# Patient Record
Sex: MALE | Employment: FULL TIME | URBAN - NONMETROPOLITAN AREA
[De-identification: names, ages, dates, MRNs, and addresses within clinical notes are randomized per-mention and may not be internally consistent; named-entity substitution may affect disease eponyms.]

---

## 2022-06-21 ENCOUNTER — OFFICE VISIT (OUTPATIENT)
Dept: PRIMARY CARE CLINIC | Age: 47
End: 2022-06-21
Payer: COMMERCIAL

## 2022-06-21 VITALS
OXYGEN SATURATION: 98 % | HEIGHT: 71 IN | BODY MASS INDEX: 25.55 KG/M2 | SYSTOLIC BLOOD PRESSURE: 98 MMHG | DIASTOLIC BLOOD PRESSURE: 64 MMHG | HEART RATE: 88 BPM | WEIGHT: 182.5 LBS

## 2022-06-21 DIAGNOSIS — K21.00 GASTROESOPHAGEAL REFLUX DISEASE WITH ESOPHAGITIS, UNSPECIFIED WHETHER HEMORRHAGE: ICD-10-CM

## 2022-06-21 DIAGNOSIS — E11.9 TYPE 2 DIABETES MELLITUS WITHOUT COMPLICATION, WITHOUT LONG-TERM CURRENT USE OF INSULIN (HCC): Primary | ICD-10-CM

## 2022-06-21 DIAGNOSIS — Z09 HOSPITAL DISCHARGE FOLLOW-UP: ICD-10-CM

## 2022-06-21 PROBLEM — K21.9 GERD (GASTROESOPHAGEAL REFLUX DISEASE): Status: ACTIVE | Noted: 2022-03-25

## 2022-06-21 PROBLEM — E11.10 DIABETIC KETOACIDOSIS WITHOUT COMA (HCC): Status: ACTIVE | Noted: 2022-04-12

## 2022-06-21 PROBLEM — D41.01 NEOPLASM OF UNCERTAIN BEHAVIOR OF RIGHT KIDNEY: Status: ACTIVE | Noted: 2022-04-04

## 2022-06-21 PROCEDURE — 99204 OFFICE O/P NEW MOD 45 MIN: CPT | Performed by: NURSE PRACTITIONER

## 2022-06-21 PROCEDURE — 1111F DSCHRG MED/CURRENT MED MERGE: CPT | Performed by: NURSE PRACTITIONER

## 2022-06-21 RX ORDER — INSULIN GLARGINE 100 [IU]/ML
35 INJECTION, SOLUTION SUBCUTANEOUS
COMMUNITY
Start: 2021-12-31

## 2022-06-21 RX ORDER — ONDANSETRON 8 MG/1
8 TABLET, ORALLY DISINTEGRATING ORAL PRN
COMMUNITY
Start: 2022-06-10

## 2022-06-21 SDOH — ECONOMIC STABILITY: FOOD INSECURITY: WITHIN THE PAST 12 MONTHS, THE FOOD YOU BOUGHT JUST DIDN'T LAST AND YOU DIDN'T HAVE MONEY TO GET MORE.: NEVER TRUE

## 2022-06-21 SDOH — ECONOMIC STABILITY: FOOD INSECURITY: WITHIN THE PAST 12 MONTHS, YOU WORRIED THAT YOUR FOOD WOULD RUN OUT BEFORE YOU GOT MONEY TO BUY MORE.: NEVER TRUE

## 2022-06-21 ASSESSMENT — PATIENT HEALTH QUESTIONNAIRE - PHQ9
SUM OF ALL RESPONSES TO PHQ QUESTIONS 1-9: 0
SUM OF ALL RESPONSES TO PHQ QUESTIONS 1-9: 0
2. FEELING DOWN, DEPRESSED OR HOPELESS: 0
SUM OF ALL RESPONSES TO PHQ9 QUESTIONS 1 & 2: 0
SUM OF ALL RESPONSES TO PHQ QUESTIONS 1-9: 0
1. LITTLE INTEREST OR PLEASURE IN DOING THINGS: 0
SUM OF ALL RESPONSES TO PHQ QUESTIONS 1-9: 0

## 2022-06-21 ASSESSMENT — ENCOUNTER SYMPTOMS
EYE REDNESS: 0
COUGH: 0
WHEEZING: 0
SORE THROAT: 0
DIARRHEA: 0
VOMITING: 0
SHORTNESS OF BREATH: 0
NAUSEA: 0
ABDOMINAL PAIN: 0
EYE DISCHARGE: 0
RHINORRHEA: 0

## 2022-06-21 ASSESSMENT — SOCIAL DETERMINANTS OF HEALTH (SDOH): HOW HARD IS IT FOR YOU TO PAY FOR THE VERY BASICS LIKE FOOD, HOUSING, MEDICAL CARE, AND HEATING?: NOT HARD AT ALL

## 2022-06-21 NOTE — PROGRESS NOTES
[address] [facility]  [facility] [address]  [address] [address]  Dept: [phone]    Valeria Dockery is a 51 y.o. male New patient, who presents today for his medical conditions/complaints as noted below. Chief Complaint   Patient presents with    New Patient     pt is diabetic - sees endo - last A1C 8.9 04/13/22    Follow-Up from Hospital     pt reports having an ulcer - Dr. Mila Sims in [place] -        HPI:     HPI  Endocrinologist is Dr. Bonnie Hobbs in [place]  He was hospitalized 6/6/22 for 2 days at [facility] and then again on 6/11/22 for 2 days at [facility]   Each time ulcer became aggravated and he has nausea and vomiting and is unable to eat or drink he can not stay hydrated. This is a reoccurring issue  This is the 3rd time he has been in the hospital for the same thing this year. He states it usually last about 10 days. He is seeing Dr. Fish, gastroenterologist, in [place]. Dr. Fish does not him to take something for GERD until he has EGD completed. His PCP moved his practice so he needs a new PCP. HgA1C is usually a little lower than 8.9. He had partial kidney removal on 4/4/22 due to renal cell carcinoma. The removal took care of cancer. No chemotherapy or radiation.       Reviewed prior notes: oncology and urology   Reviewed previous:  Labs and Hospital Records    No results found for: LDLCHOLESTEROL, LDLCALC    (goal LDL is <100)   No results found for: AST, ALT, BUN, CR, LABA1C, TSH  BP Readings from Last 3 Encounters:   06/21/22 98/64          (goal 120/80)    Past Medical History:   Diagnosis Date    Cancer ([address]     kidney - partial kidney removal    Diabetes mellitus ([address]     GERD (gastroesophageal reflux disease)     ulcer      Past Surgical History:   Procedure Laterality Date    CHOLECYSTECTOMY  2017    KIDNEY SURGERY  04/04/2022    partial removal       Family History   Problem Relation Age of Onset    Heart Failure Father     Cancer Father         lung    Heart Failure Paternal Grandmother     Heart Failure Paternal Grandfather        Social History     Tobacco Use    Smoking status: Never Smoker    Smokeless tobacco: Never Used   Substance Use Topics    Alcohol use: Yes     Alcohol/week: 6.0 standard drinks     Types: 6 Cans of beer per week     Comment: on the weekend      Current Outpatient Medications   Medication Sig Dispense Refill    insulin glargine (BASAGLAR KWIKPEN) 100 UNIT/ML injection pen Inject 35 Units into the skin every morning (before breakfast)      metFORMIN (GLUCOPHAGE) 500 MG tablet Take 500 mg by mouth 2 times daily      ondansetron (ZOFRAN-ODT) 8 MG TBDP disintegrating tablet Place 8 mg under the tongue as needed       No current facility-administered medications for this visit. Allergies   Allergen Reactions    Bee Venom Anaphylaxis and Swelling    Hydrocodone-Acetaminophen Hives and Swelling       Health Maintenance   Topic Date Due    COVID-19 Vaccine (1) Never done    Depression Screen  Never done    HIV screen  Never done    Hepatitis C screen  Never done    DTaP/Tdap/Td vaccine (1 - Tdap) Never done    Diabetes screen  Never done    Lipids  Never done    Colorectal Cancer Screen  Never done    Flu vaccine (Season Ended) 09/01/2022    Hepatitis A vaccine  Aged Out    Hepatitis B vaccine  Aged Out    Hib vaccine  Aged Out    Meningococcal (ACWY) vaccine  Aged Out    Pneumococcal 0-64 years Vaccine  Aged Out       Subjective:      Review of Systems   Constitutional: Negative for chills and fever. HENT: Negative for rhinorrhea and sore throat. Eyes: Negative for discharge and redness. Respiratory: Negative for cough, shortness of breath and wheezing. Cardiovascular: Negative for chest pain and palpitations. Gastrointestinal: Negative for abdominal pain, diarrhea, nausea and vomiting. Genitourinary: Negative for dysuria and frequency. Musculoskeletal: Negative for arthralgias, gait problem and myalgias. Skin: Positive for rash (on face is normal for 4 hours after shower). Neurological: Positive for dizziness, light-headedness (when sick) and headaches (weekly). Psychiatric/Behavioral: Negative for dysphoric mood and sleep disturbance. The patient is not nervous/anxious. Objective:     BP 98/64   Pulse 88   Ht 5' 10.75\" (1.797 m)   Wt 182 lb 8 oz (82.8 kg)   SpO2 98%   BMI 25.63 kg/m²   Physical Exam  Vitals and nursing note reviewed. Constitutional:       General: He is not in acute distress. Appearance: He is well-developed. He is not ill-appearing. HENT:      Head: Normocephalic and atraumatic. Right Ear: External ear normal.      Left Ear: External ear normal.   Eyes:      General: No scleral icterus. Right eye: No discharge. Left eye: No discharge. Conjunctiva/sclera: Conjunctivae normal.   Neck:      Thyroid: No thyromegaly. Trachea: No tracheal deviation. Cardiovascular:      Rate and Rhythm: Normal rate and regular rhythm. Pulses:           Dorsalis pedis pulses are 2+ on the right side and 2+ on the left side. Posterior tibial pulses are 1+ on the right side and 1+ on the left side. Heart sounds: Normal heart sounds. Comments: No carotid bruit  Pulmonary:      Effort: Pulmonary effort is normal. No respiratory distress. Breath sounds: Normal breath sounds. No wheezing. Abdominal:      General: Bowel sounds are normal.      Palpations: Abdomen is soft. Musculoskeletal:      Right lower leg: No edema. Left lower leg: No edema. Feet:      Right foot:      Skin integrity: Erythema and dry skin present. Toenail Condition: Right toenails are abnormally thick. Fungal disease present. Left foot:      Skin integrity: Erythema and dry skin present. Toenail Condition: Left toenails are abnormally thick.  Fungal disease present. Lymphadenopathy:      Cervical: No cervical adenopathy. Skin:     General: Skin is warm. Findings: No rash. Comments: Erythema on face  Bilateral feet erythema and dry and flaking   Neurological:      Mental Status: He is alert and oriented to person, place, and time. Cranial Nerves: No cranial nerve deficit. Motor: No weakness. Psychiatric:         Mood and Affect: Mood normal.         Behavior: Behavior normal.         Thought Content: Thought content normal.         Assessment/Plan:   1. Type 2 diabetes mellitus without complication, without long-term current use of insulin ([facility]  2. Gastroesophageal reflux disease with esophagitis, unspecified whether hemorrhage     Will fill out [facility] paperwork  Request previous records from PCP    Return in about 6 months (around 12/21/2022) for health maintenance/physical.  Data Unavailable     No orders of the defined types were placed in this encounter. No orders of the defined types were placed in this encounter. Patient given educational materials - see patient instructions. Discussed use, benefit, and side effects of prescribed medications. All patient questions answered. Pt voiced understanding. Reviewed health maintenance. Instructed to continue current medications, diet and exercise. Patient agreed with treatment plan. Follow up as directed.      Electronically signed by NIYA Coto CNP on 6/21/2022 at 10:55 AM     Post-Discharge Transitional Care Management Progress Note      Primitivo Magallanes   YOB: 1975    Date of Office Visit:  6/21/2022  Date of Hospital Admission: 6/11/22   Date of Hospital Discharge: 6/12/2022    Care management risk score Rising risk (score 2-5) and Complex Care (Scores >=6): 1     Non face to face  following discharge, date last encounter closed (first attempt may have been earlier): *No documented post hospital discharge outreach found in the last 14 days *No documented post hospital discharge outreach found in the last 14 days    Call initiated 2 business days of discharge: *No response recorded in the last 14 days    ASSESSMENT/PLAN:   Type 2 diabetes mellitus without complication, without long-term current use of insulin ([facility]  Gastroesophageal reflux disease with esophagitis, unspecified whether hemorrhage  -     [address] [place] [address] discharge follow-up  -     DE DISCHARGE MEDS RECONCILED W/ CURRENT OUTPATIENT MED LIST      Medical Decision Making: high complexity  Return in about 6 months (around 12/21/2022) for health maintenance/physical.         Subjective:   HPI:  Follow up of Hospital problems/diagnosis(es): see above    Inpatient course: Discharge summary reviewed- see chart. 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 If you have any questions, ask your nurse or doctor.             CONTINUE taking these medications    Basaglar KwikPen 100 UNIT/ML injection pen  Generic drug: insulin glargine     metFORMIN 500 MG tablet  Commonly known as: GLUCOPHAGE     ondansetron 8 MG Tbdp disintegrating tablet  Commonly known as: ZOFRAN-ODT              Medications marked \"taking\" at this time  Outpatient Medications Marked as Taking for the 6/21/22 encounter (Office Visit) with NIYA Armstrong CNP   Medication Sig Dispense Refill    insulin glargine (BASAGLAR KWIKPEN) 100 UNIT/ML injection pen Inject 35 Units into the skin every morning (before breakfast)      metFORMIN (GLUCOPHAGE) 500 MG tablet Take 500 mg by mouth 2 times daily          Medications patient taking as of now reconciled against medications ordered at time of hospital discharge: Yes    see above    Objective:    BP 98/64   Pulse 88   Ht 5' 10.75\" (1.797 m)   Wt 182 lb 8 oz (82.8 kg)   SpO2 98%   BMI 25.63 kg/m²   See above    An electronic signature was used to authenticate this note.   --Jorie Castleman, NIYA - CNP

## 2022-09-28 LAB
AVERAGE GLUCOSE: 180
HBA1C MFR BLD: 7.9 %

## 2022-09-29 ENCOUNTER — OFFICE VISIT (OUTPATIENT)
Dept: PRIMARY CARE CLINIC | Age: 47
End: 2022-09-29
Payer: COMMERCIAL

## 2022-09-29 VITALS
BODY MASS INDEX: 24.72 KG/M2 | WEIGHT: 176 LBS | SYSTOLIC BLOOD PRESSURE: 160 MMHG | HEART RATE: 89 BPM | OXYGEN SATURATION: 100 % | DIASTOLIC BLOOD PRESSURE: 98 MMHG

## 2022-09-29 DIAGNOSIS — E11.9 TYPE 2 DIABETES MELLITUS WITHOUT COMPLICATION, WITHOUT LONG-TERM CURRENT USE OF INSULIN (HCC): ICD-10-CM

## 2022-09-29 DIAGNOSIS — R10.84 GENERALIZED ABDOMINAL PAIN: Primary | ICD-10-CM

## 2022-09-29 DIAGNOSIS — R11.2 NON-INTRACTABLE VOMITING WITH NAUSEA, UNSPECIFIED VOMITING TYPE: ICD-10-CM

## 2022-09-29 PROCEDURE — 99213 OFFICE O/P EST LOW 20 MIN: CPT | Performed by: NURSE PRACTITIONER

## 2022-09-29 RX ORDER — SUCRALFATE ORAL 1 G/10ML
1 SUSPENSION ORAL 4 TIMES DAILY
Qty: 1200 ML | Refills: 3 | Status: SHIPPED | OUTPATIENT
Start: 2022-09-29

## 2022-09-29 RX ORDER — PANTOPRAZOLE SODIUM 40 MG/1
TABLET, DELAYED RELEASE ORAL
COMMUNITY
Start: 2022-09-28

## 2022-09-29 ASSESSMENT — ENCOUNTER SYMPTOMS
HEMATOCHEZIA: 0
SHORTNESS OF BREATH: 0
NAUSEA: 1
COUGH: 0
VOMITING: 1
CONSTIPATION: 0
ABDOMINAL PAIN: 1
DIARRHEA: 0
BACK PAIN: 0

## 2022-09-29 NOTE — PROGRESS NOTES
[facility]  [address] [place] [id number]  Dept: [phone]    Reggie Aaron is a 51 y.o. male Established patient, who presents today for his medical conditions/complaints as noted below. Chief Complaint   Patient presents with    Abdominal Pain     Here today for abdominal pain. And to discuss getting FMLA for work for this. He says he has had all the testing done. He said he never heard anything back and he never followed up, The scope was done by Dr. Hortencia Hanson in [place] it is in chart. He was also seen at [facility] yesterday and a CT was done. HPI:     Abdominal Pain  This is a recurrent problem. Episode onset: 5 years ago. Episode frequency: This episode started last Friday. This is the forth episode this year. The pain is located in the LUQ, RUQ and epigastric region. The pain is severe. Associated symptoms include nausea and vomiting. Pertinent negatives include no arthralgias, constipation, diarrhea, headaches, hematochezia or hematuria. The pain is relieved by Nothing. He has tried nothing for the symptoms. In the past when he has had the pain it was in conjunction with DKA. However this time his blood sugar seems to be controlled. He had endoscopy and gastric emptying test in [place] and they were normal.  He was hospitalize on 9/27 and discharged with script for Protonix. He felt OK when he left hospital. He went home and laid around all night and woke up with abdominal pain and nausea. He had  sip of Gatorade this morning.   Nothing more to eat or drink  Zofran is not effective for nausea  Reviewed prior notes:  Gastroenterology     Reviewed previous:  Labs, Imaging, and Hospital Records    No results found for: LDLCHOLESTEROL, LDLCALC    (goal LDL is <100)   No results found for: AST, ALT, BUN, CR, LABA1C, TSH  BP Readings from Last 3 Encounters:   09/29/22 (!) 160/98   06/21/22 98/64          (goal 120/80)    Past Medical History:   Diagnosis Date    Cancer ([facility]     kidney - partial kidney removal    Diabetes mellitus ([facility]     GERD (gastroesophageal reflux disease)     ulcer      Past Surgical History:   Procedure Laterality Date    CHOLECYSTECTOMY  2017    KIDNEY SURGERY  04/04/2022    partial removal       Family History   Problem Relation Age of Onset    Heart Failure Father     Cancer Father         lung    Heart Failure Paternal Grandmother     Heart Failure Paternal Grandfather        Social History     Tobacco Use    Smoking status: Never    Smokeless tobacco: Never   Substance Use Topics    Alcohol use: Yes     Alcohol/week: 6.0 standard drinks     Types: 6 Cans of beer per week     Comment: on the weekend      Current Outpatient Medications   Medication Sig Dispense Refill    pantoprazole (PROTONIX) 40 MG tablet       sucralfate (CARAFATE) 1 GM/10ML suspension Take 10 mLs by mouth 4 times daily 1200 mL 3    insulin glargine (BASAGLAR KWIKPEN) 100 UNIT/ML injection pen Inject 35 Units into the skin every morning (before breakfast)      metFORMIN (GLUCOPHAGE) 500 MG tablet Take 500 mg by mouth 2 times daily      ondansetron (ZOFRAN-ODT) 8 MG TBDP disintegrating tablet Place 8 mg under the tongue as needed       No current facility-administered medications for this visit.      Allergies   Allergen Reactions    Bee Venom Anaphylaxis and Swelling    Hydrocodone-Acetaminophen Hives and Swelling       Health Maintenance   Topic Date Due    COVID-19 Vaccine (1) Never done    Pneumococcal 0-64 years Vaccine (1 - PCV) Never done    Diabetic foot exam  Never done    Lipids  Never done    HIV screen  Never done    Diabetic microalbuminuria test  Never done    Diabetic retinal exam  Never done    Hepatitis C screen  Never done    DTaP/Tdap/Td vaccine (1 - Tdap) Never done    Colorectal Cancer Screen  Never done    Flu vaccine (1) Never done    Depression Screen  06/21/2023    A1C test (Diabetic or Prediabetic) 09/28/2023    Hepatitis A vaccine  Aged Out    Hepatitis B vaccine  Aged Out    Hib vaccine  Aged Out    Meningococcal (ACWY) vaccine  Aged Out       Subjective:      Review of Systems   Constitutional:  Positive for activity change and fatigue. HENT:  Negative for congestion and ear pain. Respiratory:  Negative for cough and shortness of breath. Gastrointestinal:  Positive for abdominal pain, nausea and vomiting. Negative for constipation, diarrhea and hematochezia. Genitourinary:  Negative for hematuria. Musculoskeletal:  Negative for arthralgias and back pain. Neurological:  Negative for light-headedness and headaches. Psychiatric/Behavioral:  Positive for sleep disturbance. The patient is nervous/anxious. Objective:     BP (!) 160/98   Pulse 89   Wt 176 lb (79.8 kg)   SpO2 100%   BMI 24.72 kg/m²   Physical Exam  Vitals and nursing note reviewed. Constitutional:       General: He is in acute distress. Appearance: He is ill-appearing. HENT:      Head: Normocephalic and atraumatic. Right Ear: External ear normal.      Left Ear: External ear normal.   Cardiovascular:      Rate and Rhythm: Normal rate and regular rhythm. Heart sounds: Normal heart sounds. Pulmonary:      Effort: Pulmonary effort is normal.      Breath sounds: Normal breath sounds. Abdominal:      General: Bowel sounds are normal.      Palpations: There is no mass. Tenderness: There is guarding. Hernia: No hernia is present. Comments: Severe tenderness throughout abdomen. Guarding     Skin:     General: Skin is warm and dry. Neurological:      Mental Status: He is alert and oriented to person, place, and time. Psychiatric:         Mood and Affect: Mood normal.         Thought Content: Thought content normal.       Assessment/Plan:   1. Generalized abdominal pain  -     sucralfate (CARAFATE) 1 GM/10ML suspension; Take 10 mLs by mouth 4 times daily, Disp-1200 mL, R-3Normal  2. Non-intractable vomiting with nausea, unspecified vomiting type  3. Type 2 diabetes mellitus without complication, without long-term current use of insulin ([facility]       He should be off work until symptoms subside. He will call when feeling better. It is appropriate for him to have FMLA for these repeated episodes of abdominal pain that typically last about 10 days. He needs follow up with Dr. Tatyana Black, GI. Return if symptoms worsen or fail to improve. Data Unavailable     No orders of the defined types were placed in this encounter. Orders Placed This Encounter   Medications    sucralfate (CARAFATE) 1 GM/10ML suspension     Sig: Take 10 mLs by mouth 4 times daily     Dispense:  1200 mL     Refill:  3         Patient given educational materials - see patient instructions. Discussed use, benefit, and side effects of prescribed medications. All patient questions answered. Pt voiced understanding. Reviewed health maintenance. Instructed to continue current medications, diet and exercise. Patient agreed with treatment plan. Follow up as directed.      Electronically signed by NIYA Colon CNP on 9/29/2022 at 10:54 AM

## 2022-09-30 ENCOUNTER — TELEPHONE (OUTPATIENT)
Dept: PRIMARY CARE CLINIC | Age: 47
End: 2022-09-30

## 2022-09-30 NOTE — TELEPHONE ENCOUNTER
I have [place] paperwork completed for him but he never signed the release page. So I cannot send it back. LVM for him to either call or stop in the office to sign. There are 2 places he needs to sign. Forms are in the bottom of the rack on my desk.

## 2022-10-05 ENCOUNTER — TELEPHONE (OUTPATIENT)
Dept: PRIMARY CARE CLINIC | Age: 47
End: 2022-10-05

## 2022-10-05 NOTE — TELEPHONE ENCOUNTER
Yes, that is OK. I will sign the papers. However, I at [facility] today. Tomorrow I will be at [facility] but Friday, I am in the nursing homes and then off next week. Can he get the papers to [facility] by tomorrow?

## 2022-10-05 NOTE — TELEPHONE ENCOUNTER
We just completed [facility] paper work. If he would like short term disability I a can sign that paperwork. In which case I would need to see him in about a month.

## 2022-10-05 NOTE — TELEPHONE ENCOUNTER
----- Message from Janice Menard sent at 10/4/2022 10:11 AM EDT -----  Subject: Message to Provider    QUESTIONS  Information for Provider? Pt would like to speak to PCP to determine when   he to be seen again. He went back to [facility] on 10/1 Saturday   and was admitted for 2 days. He wants to extend time off since he is   feeling weak having lost 15 lbs. Please call back to advise.  ---------------------------------------------------------------------------  --------------  Miranda CARTER  [phone]; OK to leave message on voicemail  ---------------------------------------------------------------------------  --------------  SCRIPT ANSWERS  Relationship to Patient?  Self

## 2022-10-05 NOTE — TELEPHONE ENCOUNTER
----- Message from Jennifer Horta sent at 10/5/2022  9:52 AM EDT -----  Subject: Message to Provider    QUESTIONS  Information for Provider? patient would like to return to work on Monday October 10th. There are 2 separate paperwork [facility]) that have to be filled   out, one is to be faxed and one he takes in. He would like to know if it   is ok to come into the office to sign the paperwork. If someone could give   him a call back he would appreciate it.  ---------------------------------------------------------------------------  --------------  Marietta CARTER  [phone]; OK to leave message on voicemail  ---------------------------------------------------------------------------  --------------  SCRIPT ANSWERS  Relationship to Patient?  Self

## 2022-11-16 ENCOUNTER — OFFICE VISIT (OUTPATIENT)
Dept: PRIMARY CARE CLINIC | Age: 47
End: 2022-11-16
Payer: COMMERCIAL

## 2022-11-16 VITALS
DIASTOLIC BLOOD PRESSURE: 80 MMHG | BODY MASS INDEX: 27.11 KG/M2 | WEIGHT: 193 LBS | OXYGEN SATURATION: 98 % | HEART RATE: 83 BPM | SYSTOLIC BLOOD PRESSURE: 118 MMHG

## 2022-11-16 DIAGNOSIS — R11.0 NAUSEA: Primary | ICD-10-CM

## 2022-11-16 DIAGNOSIS — R10.84 GENERALIZED ABDOMINAL PAIN: ICD-10-CM

## 2022-11-16 PROCEDURE — 99213 OFFICE O/P EST LOW 20 MIN: CPT | Performed by: NURSE PRACTITIONER

## 2022-11-16 RX ORDER — ONDANSETRON 4 MG/1
4 TABLET, FILM COATED ORAL DAILY
Qty: 30 TABLET | Refills: 0 | Status: SHIPPED | OUTPATIENT
Start: 2022-11-16

## 2022-11-16 RX ORDER — SUCRALFATE ORAL 1 G/10ML
1 SUSPENSION ORAL 4 TIMES DAILY
Qty: 1200 ML | Refills: 3 | Status: SHIPPED | OUTPATIENT
Start: 2022-11-16

## 2022-11-16 RX ORDER — ONDANSETRON 4 MG/1
4 TABLET, FILM COATED ORAL DAILY
COMMUNITY
Start: 2022-09-28 | End: 2022-11-16 | Stop reason: SDUPTHER

## 2022-11-16 ASSESSMENT — ENCOUNTER SYMPTOMS
SORE THROAT: 0
EYE REDNESS: 0
RHINORRHEA: 0
DIARRHEA: 0
COUGH: 0
SHORTNESS OF BREATH: 0
WHEEZING: 0
EYE DISCHARGE: 0
VOMITING: 0
ABDOMINAL PAIN: 1
NAUSEA: 1

## 2022-11-16 ASSESSMENT — PATIENT HEALTH QUESTIONNAIRE - PHQ9
2. FEELING DOWN, DEPRESSED OR HOPELESS: 0
SUM OF ALL RESPONSES TO PHQ QUESTIONS 1-9: 0
SUM OF ALL RESPONSES TO PHQ QUESTIONS 1-9: 0
1. LITTLE INTEREST OR PLEASURE IN DOING THINGS: 0
SUM OF ALL RESPONSES TO PHQ QUESTIONS 1-9: 0
SUM OF ALL RESPONSES TO PHQ QUESTIONS 1-9: 0
SUM OF ALL RESPONSES TO PHQ9 QUESTIONS 1 & 2: 0

## 2022-11-16 NOTE — PROGRESS NOTES
[address] [facility] [facility]  [facility] Hospital[facility] [address]  [facility] [address] [place] [address]  Dept: [phone]    Nnamdi Stark is a 49 y.o. male Established patient, who presents today for his medical conditions/complaints as noted below. Chief Complaint   Patient presents with    Forms     FMLA & Disability. 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 Each one lasting about 10 days each time. He does not have much warning when they will occur. He can not eat or drink when they occur and has needed hospitalized. He has made dietary changes. He has eliminated hot sauce - he used to use a lot. His kids are lactose intolerance and he has stopped drinking milk after 6 pm because it causes cramping. He cut way back on alcohol. He has made small steps in changing diet and gradually reintroducing foods. He has seen CARY Mcdonald, in [place], and everything came back OK. Carfate is helping - particularly in the morning. He uses Zofran only as needed. 6 month follow up with Oncologist was OK.     Reviewed prior notes:  oncologist  - right kidney neoplasm   Reviewed previous:  Labs    No results found for: [place]    (goal LDL is <100)   No results found for: AST, ALT, BUN, CR, LABA1C, TSH  BP Readings from Last 3 Encounters:   11/16/22 118/80   09/29/22 (!) 160/98   06/21/22 98/64          (goal 120/80)    Past Medical History:   Diagnosis Date    Cancer ([address]     kidney - partial kidney removal    Diabetes mellitus ([address]     GERD (gastroesophageal reflux disease)     ulcer      Past Surgical History:   Procedure Laterality Date    CHOLECYSTECTOMY  2017    KIDNEY SURGERY  04/04/2022    partial removal       Family History   Problem Relation Age of Onset    Heart Failure Father     Cancer Father         lung    Heart Failure Paternal Grandmother     Heart Failure Paternal Grandfather Social History     Tobacco Use    Smoking status: Never    Smokeless tobacco: Never   Substance Use Topics    Alcohol use: Yes     Alcohol/week: 6.0 standard drinks     Types: 6 Cans of beer per week     Comment: on the weekend      Current Outpatient Medications   Medication Sig Dispense Refill    sucralfate (CARAFATE) 1 GM/10ML suspension Take 10 mLs by mouth 4 times daily 1200 mL 3    ondansetron (ZOFRAN) 4 MG tablet Take 1 tablet by mouth daily 30 tablet 0    insulin glargine (BASAGLAR KWIKPEN) 100 UNIT/ML injection pen Inject 35 Units into the skin every morning (before breakfast)      metFORMIN (GLUCOPHAGE) 500 MG tablet Take 500 mg by mouth 2 times daily      ondansetron (ZOFRAN-ODT) 8 MG TBDP disintegrating tablet Place 8 mg under the tongue as needed       No current facility-administered medications for this visit. Allergies   Allergen Reactions    Bee Venom Anaphylaxis and Swelling    Hydrocodone-Acetaminophen Hives and Swelling       Health Maintenance   Topic Date Due    COVID-19 Vaccine (1) Never done    Pneumococcal 0-64 years Vaccine (1 - PCV) Never done    Diabetic foot exam  Never done    Lipids  Never done    HIV screen  Never done    Diabetic microalbuminuria test  Never done    Diabetic retinal exam  Never done    Hepatitis C screen  Never done    Hepatitis B vaccine (1 of 3 - Risk 3-dose series) Never done    DTaP/Tdap/Td vaccine (1 - Tdap) Never done    Colorectal Cancer Screen  Never done    Flu vaccine (1) Never done    Depression Screen  06/21/2023    A1C test (Diabetic or Prediabetic)  09/28/2023    Hepatitis A vaccine  Aged Out    Hib vaccine  Aged Out    Meningococcal (ACWY) vaccine  Aged Out       Subjective:      Review of Systems   Constitutional:  Negative for chills and fever. HENT:  Negative for rhinorrhea and sore throat. Eyes:  Negative for discharge and redness. Respiratory:  Negative for cough, shortness of breath and wheezing.     Cardiovascular:  Negative for chest pain and palpitations. Gastrointestinal:  Positive for abdominal pain and nausea. Negative for diarrhea and vomiting. Genitourinary:  Negative for dysuria and frequency. Musculoskeletal:  Negative for arthralgias and myalgias. Neurological:  Negative for dizziness, light-headedness and headaches. Psychiatric/Behavioral:  Negative for sleep disturbance. Objective:     /80   Pulse 83   Wt 193 lb (87.5 kg)   SpO2 98%   BMI 27.11 kg/m²   Physical Exam  Vitals and nursing note reviewed. Constitutional:       Appearance: Normal appearance. HENT:      Head: Normocephalic and atraumatic. Right Ear: External ear normal.      Left Ear: External ear normal.   Cardiovascular:      Rate and Rhythm: Normal rate and regular rhythm. Heart sounds: Normal heart sounds. Pulmonary:      Effort: Pulmonary effort is normal.      Breath sounds: Normal breath sounds. Abdominal:      General: Bowel sounds are normal.      Palpations: Abdomen is soft. Neurological:      Mental Status: He is alert and oriented to person, place, and time. Psychiatric:         Mood and Affect: Mood normal.         Thought Content: Thought content normal.       Assessment/Plan:   1. Nausea  -     ondansetron (ZOFRAN) 4 MG tablet; Take 1 tablet by mouth daily, Disp-30 tablet, R-0Normal  2. Generalized abdominal pain  -     sucralfate (CARAFATE) 1 GM/10ML suspension; Take 10 mLs by mouth 4 times daily, Disp-1200 mL, R-3Normal    Continue sucralfate  Continued improve diet  Ask Dr. Ashleigh Degroot to sign FMLA    Return if symptoms worsen or fail to improve. Data Unavailable     No orders of the defined types were placed in this encounter.     Orders Placed This Encounter   Medications    sucralfate (CARAFATE) 1 GM/10ML suspension     Sig: Take 10 mLs by mouth 4 times daily     Dispense:  1200 mL     Refill:  3    ondansetron (ZOFRAN) 4 MG tablet     Sig: Take 1 tablet by mouth daily     Dispense:  30 tablet     Refill:

## 2023-05-03 DIAGNOSIS — R11.0 NAUSEA: ICD-10-CM

## 2023-05-03 DIAGNOSIS — E11.9 TYPE 2 DIABETES MELLITUS WITHOUT COMPLICATION, WITHOUT LONG-TERM CURRENT USE OF INSULIN (HCC): Primary | ICD-10-CM

## 2023-05-03 NOTE — TELEPHONE ENCOUNTER
Patient called in and scheduled for 05/16/23 @ 3:00. Asking for refills on medication - states he does not have enough to get to appointment. Garyar pended. Lisinopril 20mg  Promethazine 25mg  Glipizide 10mg     I don't see these meds on list - in history or on hospital discharge notes. Please advise.

## 2023-05-04 RX ORDER — GLIPIZIDE 10 MG/1
10 TABLET ORAL DAILY
Qty: 60 TABLET | Refills: 3 | Status: SHIPPED | OUTPATIENT
Start: 2023-05-04

## 2023-05-04 RX ORDER — LISINOPRIL 20 MG/1
20 TABLET ORAL DAILY
Qty: 30 TABLET | Refills: 0 | Status: SHIPPED | OUTPATIENT
Start: 2023-05-04

## 2023-05-04 RX ORDER — INSULIN GLARGINE 100 [IU]/ML
35 INJECTION, SOLUTION SUBCUTANEOUS
Qty: 5 ADJUSTABLE DOSE PRE-FILLED PEN SYRINGE | Refills: 5 | Status: SHIPPED | OUTPATIENT
Start: 2023-05-04

## 2023-05-04 RX ORDER — PROMETHAZINE HYDROCHLORIDE 25 MG/1
25 TABLET ORAL 3 TIMES DAILY PRN
Qty: 12 TABLET | Refills: 0 | Status: SHIPPED | OUTPATIENT
Start: 2023-05-04 | End: 2023-05-11

## 2023-05-16 ENCOUNTER — OFFICE VISIT (OUTPATIENT)
Dept: PRIMARY CARE CLINIC | Age: 48
End: 2023-05-16
Payer: COMMERCIAL

## 2023-05-16 VITALS
HEART RATE: 84 BPM | DIASTOLIC BLOOD PRESSURE: 74 MMHG | OXYGEN SATURATION: 100 % | BODY MASS INDEX: 25.02 KG/M2 | SYSTOLIC BLOOD PRESSURE: 110 MMHG | WEIGHT: 178.1 LBS

## 2023-05-16 DIAGNOSIS — R10.84 GENERALIZED ABDOMINAL PAIN: ICD-10-CM

## 2023-05-16 DIAGNOSIS — K31.84 GASTROPARESIS: Primary | ICD-10-CM

## 2023-05-16 PROBLEM — R10.13 EPIGASTRIC PAIN: Status: ACTIVE | Noted: 2022-09-27

## 2023-05-16 PROBLEM — R10.30 LOWER ABDOMINAL PAIN: Status: ACTIVE | Noted: 2022-09-28

## 2023-05-16 PROCEDURE — 99213 OFFICE O/P EST LOW 20 MIN: CPT | Performed by: NURSE PRACTITIONER

## 2023-05-16 RX ORDER — SUCRALFATE ORAL 1 G/10ML
1 SUSPENSION ORAL 4 TIMES DAILY
Qty: 1200 ML | Refills: 3 | Status: SHIPPED | OUTPATIENT
Start: 2023-05-16

## 2023-05-16 SDOH — ECONOMIC STABILITY: INCOME INSECURITY: HOW HARD IS IT FOR YOU TO PAY FOR THE VERY BASICS LIKE FOOD, HOUSING, MEDICAL CARE, AND HEATING?: NOT HARD AT ALL

## 2023-05-16 SDOH — ECONOMIC STABILITY: FOOD INSECURITY: WITHIN THE PAST 12 MONTHS, YOU WORRIED THAT YOUR FOOD WOULD RUN OUT BEFORE YOU GOT MONEY TO BUY MORE.: NEVER TRUE

## 2023-05-16 SDOH — ECONOMIC STABILITY: HOUSING INSECURITY
IN THE LAST 12 MONTHS, WAS THERE A TIME WHEN YOU DID NOT HAVE A STEADY PLACE TO SLEEP OR SLEPT IN A SHELTER (INCLUDING NOW)?: NO

## 2023-05-16 SDOH — ECONOMIC STABILITY: FOOD INSECURITY: WITHIN THE PAST 12 MONTHS, THE FOOD YOU BOUGHT JUST DIDN'T LAST AND YOU DIDN'T HAVE MONEY TO GET MORE.: NEVER TRUE

## 2023-05-16 ASSESSMENT — PATIENT HEALTH QUESTIONNAIRE - PHQ9
SUM OF ALL RESPONSES TO PHQ QUESTIONS 1-9: 0
2. FEELING DOWN, DEPRESSED OR HOPELESS: 0
SUM OF ALL RESPONSES TO PHQ QUESTIONS 1-9: 0
1. LITTLE INTEREST OR PLEASURE IN DOING THINGS: 0
SUM OF ALL RESPONSES TO PHQ9 QUESTIONS 1 & 2: 0

## 2023-05-16 ASSESSMENT — ENCOUNTER SYMPTOMS
RHINORRHEA: 0
CONSTIPATION: 1
EYE REDNESS: 0
ABDOMINAL PAIN: 1
WHEEZING: 0
DIARRHEA: 0
COUGH: 0
SORE THROAT: 0
EYE DISCHARGE: 0
NAUSEA: 0
VOMITING: 0
SHORTNESS OF BREATH: 0

## 2023-05-16 NOTE — PROGRESS NOTES
[address] PRIMARY CARE  [facility] [address]  [place]\[facility]\"" [address] [place] [address]  Dept: [phone]    oWjciech Cross is a 49 y.o. male established patient, who presents today for his medical conditions/complaints as noted below. Chief Complaint   Patient presents with    Medication Check    Forms     FMLA       HPI:     HPI   He has appointment with endocrinologist next month. He went to [facility] on 5/7/2023 due recurrent abdominal pain. They evaluated him in ER and sent him home. He was hospitalized at [facility] from 5/11 - 5/14/2023  They diagnosed him with gastroparesis. He has  been off work since 5/3/2023. He was at [facility] from 5/11 - 5/14/2023  Now he is just getting over abdominal pain. He is thinks the dizziness should be resolved by tomorrow. Reviewed prior notes: None   Reviewed previous:  Labs, Imaging, and Hospital Records    No results found for: LDLCHOLESTEROL, LDLCALC    (goal LDL is <100)   Hemoglobin A1C (%)   Date Value   09/28/2022 7.9     BP Readings from Last 3 Encounters:   05/16/23 110/74   11/16/22 118/80   09/29/22 (!) 160/98          (goal 120/80)    Past Medical History:   Diagnosis Date    Cancer ([address]     kidney - partial kidney removal    Diabetes mellitus ([facility]     GERD (gastroesophageal reflux disease)     ulcer      Past Surgical History:   Procedure Laterality Date    CHOLECYSTECTOMY  2017    KIDNEY SURGERY  04/04/2022    partial removal       Family History   Problem Relation Age of Onset    Heart Failure Father     Cancer Father         lung    Heart Failure Paternal Grandmother     Heart Failure Paternal Grandfather        Social History     Tobacco Use    Smoking status: Never    Smokeless tobacco: Never   Substance Use Topics    Alcohol use:  Yes     Alcohol/week: 6.0 standard drinks     Types: 6 Cans of beer per week     Comment: on the weekend      Current Outpatient Medications   Medication Sig Dispense

## 2023-06-06 RX ORDER — LISINOPRIL 20 MG/1
TABLET ORAL
Qty: 30 TABLET | Refills: 0 | Status: SHIPPED | OUTPATIENT
Start: 2023-06-06

## 2023-06-21 RX ORDER — LISINOPRIL 20 MG/1
20 TABLET ORAL DAILY
Qty: 90 TABLET | Refills: 0 | Status: SHIPPED | OUTPATIENT
Start: 2023-06-21

## 2023-07-27 ENCOUNTER — OFFICE VISIT (OUTPATIENT)
Dept: PRIMARY CARE CLINIC | Age: 48
End: 2023-07-27
Payer: COMMERCIAL

## 2023-07-27 VITALS
HEART RATE: 84 BPM | OXYGEN SATURATION: 99 % | BODY MASS INDEX: 26.88 KG/M2 | HEIGHT: 70 IN | SYSTOLIC BLOOD PRESSURE: 110 MMHG | DIASTOLIC BLOOD PRESSURE: 70 MMHG | WEIGHT: 187.8 LBS

## 2023-07-27 DIAGNOSIS — R10.84 GENERALIZED ABDOMINAL PAIN: ICD-10-CM

## 2023-07-27 DIAGNOSIS — K31.84 GASTROPARESIS: Primary | ICD-10-CM

## 2023-07-27 PROCEDURE — 99213 OFFICE O/P EST LOW 20 MIN: CPT | Performed by: NURSE PRACTITIONER

## 2023-07-27 ASSESSMENT — ENCOUNTER SYMPTOMS
ABDOMINAL PAIN: 0
RHINORRHEA: 0
SHORTNESS OF BREATH: 0
SORE THROAT: 0
NAUSEA: 0
WHEEZING: 0
DIARRHEA: 0
EYE REDNESS: 0
COUGH: 0
EYE DISCHARGE: 0
VOMITING: 0

## 2023-09-14 DIAGNOSIS — E11.9 TYPE 2 DIABETES MELLITUS WITHOUT COMPLICATION, WITHOUT LONG-TERM CURRENT USE OF INSULIN (HCC): Primary | ICD-10-CM

## 2023-09-14 DIAGNOSIS — E11.9 TYPE 2 DIABETES MELLITUS WITHOUT COMPLICATION, WITHOUT LONG-TERM CURRENT USE OF INSULIN (HCC): ICD-10-CM

## 2023-09-14 RX ORDER — ATORVASTATIN CALCIUM 20 MG/1
20 TABLET, FILM COATED ORAL NIGHTLY
Qty: 90 TABLET | Refills: 1 | Status: SHIPPED | OUTPATIENT
Start: 2023-09-14 | End: 2023-09-14 | Stop reason: SDUPTHER

## 2023-09-14 RX ORDER — ATORVASTATIN CALCIUM 20 MG/1
20 TABLET, FILM COATED ORAL NIGHTLY
Qty: 90 TABLET | Refills: 1 | Status: SHIPPED | OUTPATIENT
Start: 2023-09-14

## 2023-09-14 NOTE — TELEPHONE ENCOUNTER
LAST VISIT:   7/27/2023     Future Appointments   Date Time Provider [address]   1/25/2024  3:00 PM NIYA York - [facility] Kim Iglesias

## 2023-09-22 RX ORDER — LISINOPRIL 20 MG/1
20 TABLET ORAL DAILY
Qty: 90 TABLET | Refills: 0 | Status: SHIPPED | OUTPATIENT
Start: 2023-09-22

## 2023-09-22 NOTE — TELEPHONE ENCOUNTER
LAST VISIT:   7/27/2023     Future Appointments   Date Time Provider [address]   1/25/2024  3:00 PM NIYA Sorensen - C[id number]            [facility] [place]

## 2023-11-02 ENCOUNTER — OFFICE VISIT (OUTPATIENT)
Dept: PRIMARY CARE CLINIC | Age: 48
End: 2023-11-02
Payer: COMMERCIAL

## 2023-11-02 VITALS
HEART RATE: 88 BPM | SYSTOLIC BLOOD PRESSURE: 122 MMHG | BODY MASS INDEX: 27.32 KG/M2 | OXYGEN SATURATION: 97 % | DIASTOLIC BLOOD PRESSURE: 80 MMHG | WEIGHT: 190.4 LBS

## 2023-11-02 DIAGNOSIS — E11.9 TYPE 2 DIABETES MELLITUS WITHOUT COMPLICATION, WITHOUT LONG-TERM CURRENT USE OF INSULIN (HCC): ICD-10-CM

## 2023-11-02 DIAGNOSIS — T78.2XXS ANAPHYLAXIS, SEQUELA: Primary | ICD-10-CM

## 2023-11-02 PROBLEM — E10.9 TYPE 1 DIABETES MELLITUS (HCC): Status: ACTIVE | Noted: 2022-03-25

## 2023-11-02 PROCEDURE — 99214 OFFICE O/P EST MOD 30 MIN: CPT | Performed by: NURSE PRACTITIONER

## 2023-11-02 RX ORDER — EPINEPHRINE 0.3 MG/.3ML
0.3 INJECTION SUBCUTANEOUS DAILY PRN
Qty: 2 EACH | Refills: 0 | Status: SHIPPED | OUTPATIENT
Start: 2023-11-02

## 2023-11-02 RX ORDER — GLIPIZIDE 10 MG/1
10 TABLET ORAL DAILY
Qty: 60 TABLET | Refills: 3 | Status: SHIPPED | OUTPATIENT
Start: 2023-11-02

## 2023-11-02 RX ORDER — DULAGLUTIDE 1.5 MG/.5ML
1.5 INJECTION, SOLUTION SUBCUTANEOUS WEEKLY
Qty: 2 ML | Refills: 0 | Status: SHIPPED | OUTPATIENT
Start: 2023-11-30 | End: 2023-12-22

## 2023-11-02 RX ORDER — DULAGLUTIDE 0.75 MG/.5ML
0.75 INJECTION, SOLUTION SUBCUTANEOUS WEEKLY
Qty: 2 ML | Refills: 0 | Status: SHIPPED | OUTPATIENT
Start: 2023-11-02 | End: 2023-11-24

## 2023-11-02 RX ORDER — DULAGLUTIDE 3 MG/.5ML
3 INJECTION, SOLUTION SUBCUTANEOUS WEEKLY
Qty: 2 ML | Refills: 0 | Status: SHIPPED | OUTPATIENT
Start: 2023-12-28 | End: 2024-01-19

## 2023-11-02 ASSESSMENT — ENCOUNTER SYMPTOMS
BLOOD IN STOOL: 0
CONSTIPATION: 0
DIARRHEA: 0
SHORTNESS OF BREATH: 0
COUGH: 0
BACK PAIN: 1
NAUSEA: 0

## 2023-11-02 NOTE — PROGRESS NOTES
Musculoskeletal:         General: Normal range of motion. Cervical back: Normal range of motion and neck supple. Right lower leg: No edema. Left lower leg: No edema. Lymphadenopathy:      Head:      Right side of head: No submental, submandibular or tonsillar adenopathy. Left side of head: No submental, submandibular or tonsillar adenopathy. Cervical: No cervical adenopathy. Neurological:      Mental Status: He is alert and oriented to person, place, and time. Cranial Nerves: No cranial nerve deficit. Motor: No weakness. Gait: Gait abnormal.   Psychiatric:         Mood and Affect: Mood normal.         Behavior: Behavior normal.         Assessment/Plan:   1. Anaphylaxis, sequela  -     EPINEPHrine (EPIPEN 2-MARBELLA) 0.3 MG/0.3ML SOAJ injection; Inject 0.3 mLs into the muscle daily as needed (allergic reaction) Use as directed for allergic reaction, Disp-2 each, R-0Normal  2. Type 2 diabetes mellitus without complication, without long-term current use of insulin (HCC)  -     Dulaglutide (TRULICITY) 4.51 WQ/4.7IB SOPN; Inject 0.75 mg into the skin once a week for 4 doses, Disp-2 mL, R-0Normal  -     dulaglutide (TRULICITY) 1.5 ZW/2.4KF SC injection; Inject 0.5 mLs into the skin once a week for 4 doses, Disp-2 mL, R-0Normal  -     Dulaglutide (TRULICITY) 3 PC/9.3DU SOPN; Inject 3 mg into the skin once a week for 4 doses, Disp-2 mL, R-0Normal  -     glipiZIDE (GLUCOTROL) 10 MG tablet; Take 1 tablet by mouth daily, Disp-60 tablet, R-3Normal  -     Insulin Pen Needle 32G X 4 MM MISC; DAILY Starting Thu 11/2/2023, Disp-100 each, R-3, Normal     Complete FMLA paperwork as before  Start Trulicity - weekly injection  Educated on importance of influenza and pneumonia vaccine but patient declined. He does not want any changes - he is over whelmed with Hemoglobin A1C    Will address again at next appointment.     35 minutes spent with patient with education and counseling , assessment

## 2023-12-26 DIAGNOSIS — E11.9 TYPE 2 DIABETES MELLITUS WITHOUT COMPLICATION, WITHOUT LONG-TERM CURRENT USE OF INSULIN (HCC): ICD-10-CM

## 2023-12-26 RX ORDER — INSULIN GLARGINE 100 [IU]/ML
35 INJECTION, SOLUTION SUBCUTANEOUS
Qty: 5 ADJUSTABLE DOSE PRE-FILLED PEN SYRINGE | Refills: 5 | Status: SHIPPED | OUTPATIENT
Start: 2023-12-26

## 2024-01-02 ENCOUNTER — TELEPHONE (OUTPATIENT)
Dept: PRIMARY CARE CLINIC | Age: 49
End: 2024-01-02

## 2024-01-02 NOTE — TELEPHONE ENCOUNTER
----- Message from Chacha Vásquez sent at 1/2/2024  9:47 AM EST -----  Subject: Message to Provider    QUESTIONS  Information for Provider? 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 Self

## 2024-01-09 NOTE — TELEPHONE ENCOUNTER
Patient called and asked if you could please call him re: [facility] forms.     Wfvhs--[id number]

## 2024-02-26 ENCOUNTER — OFFICE VISIT (OUTPATIENT)
Dept: PRIMARY CARE CLINIC | Age: 49
End: 2024-02-26
Payer: COMMERCIAL

## 2024-02-26 VITALS
WEIGHT: 195 LBS | HEIGHT: 70 IN | HEART RATE: 86 BPM | DIASTOLIC BLOOD PRESSURE: 70 MMHG | SYSTOLIC BLOOD PRESSURE: 120 MMHG | OXYGEN SATURATION: 96 % | BODY MASS INDEX: 27.92 KG/M2

## 2024-02-26 DIAGNOSIS — K31.84 GASTROPARESIS: ICD-10-CM

## 2024-02-26 DIAGNOSIS — C64.9 SECONDARY RENAL CELL CARCINOMA OF LEFT LUNG (HCC): Primary | ICD-10-CM

## 2024-02-26 DIAGNOSIS — E11.9 TYPE 2 DIABETES MELLITUS WITHOUT COMPLICATION, WITHOUT LONG-TERM CURRENT USE OF INSULIN (HCC): ICD-10-CM

## 2024-02-26 DIAGNOSIS — C78.02 SECONDARY RENAL CELL CARCINOMA OF LEFT LUNG (HCC): Primary | ICD-10-CM

## 2024-02-26 DIAGNOSIS — T78.2XXS ANAPHYLAXIS, SEQUELA: ICD-10-CM

## 2024-02-26 LAB — HBA1C MFR BLD: 7.7 %

## 2024-02-26 PROCEDURE — 3051F HG A1C>EQUAL 7.0%<8.0%: CPT | Performed by: NURSE PRACTITIONER

## 2024-02-26 PROCEDURE — 83036 HEMOGLOBIN GLYCOSYLATED A1C: CPT | Performed by: NURSE PRACTITIONER

## 2024-02-26 PROCEDURE — 99214 OFFICE O/P EST MOD 30 MIN: CPT | Performed by: NURSE PRACTITIONER

## 2024-02-26 RX ORDER — DOXYCYCLINE HYCLATE 100 MG
100 TABLET ORAL DAILY
COMMUNITY
Start: 2023-12-04 | End: 2024-02-26 | Stop reason: SDUPTHER

## 2024-02-26 RX ORDER — GLIPIZIDE 10 MG/1
10 TABLET ORAL
Qty: 180 TABLET | Refills: 1 | Status: SHIPPED | OUTPATIENT
Start: 2024-02-26

## 2024-02-26 RX ORDER — EPINEPHRINE 0.3 MG/.3ML
0.3 INJECTION SUBCUTANEOUS DAILY PRN
Qty: 2 EACH | Refills: 0 | Status: SHIPPED | OUTPATIENT
Start: 2024-02-26

## 2024-02-26 RX ORDER — DOXYCYCLINE HYCLATE 100 MG
100 TABLET ORAL DAILY
Qty: 90 TABLET | Refills: 1 | Status: SHIPPED | OUTPATIENT
Start: 2024-02-26

## 2024-02-26 RX ORDER — INSULIN GLARGINE 100 [IU]/ML
30 INJECTION, SOLUTION SUBCUTANEOUS
Qty: 5 ADJUSTABLE DOSE PRE-FILLED PEN SYRINGE | Refills: 5 | Status: SHIPPED | OUTPATIENT
Start: 2024-02-26

## 2024-02-26 ASSESSMENT — PATIENT HEALTH QUESTIONNAIRE - PHQ9
1. LITTLE INTEREST OR PLEASURE IN DOING THINGS: 0
SUM OF ALL RESPONSES TO PHQ QUESTIONS 1-9: 0
SUM OF ALL RESPONSES TO PHQ QUESTIONS 1-9: 0
SUM OF ALL RESPONSES TO PHQ9 QUESTIONS 1 & 2: 0
SUM OF ALL RESPONSES TO PHQ QUESTIONS 1-9: 0
2. FEELING DOWN, DEPRESSED OR HOPELESS: 0
SUM OF ALL RESPONSES TO PHQ QUESTIONS 1-9: 0

## 2024-02-26 ASSESSMENT — ENCOUNTER SYMPTOMS
SHORTNESS OF BREATH: 0
RHINORRHEA: 0
COUGH: 0
NAUSEA: 0
VOMITING: 0
SORE THROAT: 0
EYE REDNESS: 0
EYE DISCHARGE: 0
ABDOMINAL PAIN: 1
WHEEZING: 0
DIARRHEA: 0
BACK PAIN: 1

## 2024-02-26 NOTE — PROGRESS NOTES
General: Bowel sounds are normal.      Palpations: Abdomen is soft.   Musculoskeletal:      Right lower leg: No edema.      Left lower leg: No edema.   Lymphadenopathy:      Cervical: No cervical adenopathy.   Skin:     General: Skin is warm.      Findings: No rash.   Neurological:      Mental Status: He is alert and oriented to person, place, and time.   Psychiatric:         Mood and Affect: Mood normal.         Behavior: Behavior normal.         Thought Content: Thought content normal.         Assessment/Plan:   1. 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 Type 2 diabetes mellitus without complication, without long-term current use of insulin ([facility])  -     insulin glargine (BASAGLAR KWIKPEN) 100 UNIT/ML injection pen; Inject 30 Units into the skin every morning (before breakfast), Disp-5 Adjustable Dose Pre-filled Pen Syringe, R-5Normal  -     glipiZIDE (GLUCOTROL) 10 MG tablet; Take 1 tablet by mouth 2 times daily (before meals), Disp-180 tablet, R-1Normal  -     POCT glycosylated hemoglobin (Hb A1C)  3. Anaphylaxis, sequela  -     EPINEPHrine (EPIPEN 2-MARBELLA) 0.3 MG/0.3ML SOAJ injection; Inject 0.3 mLs into the muscle daily as needed (allergic reaction) Use as directed for allergic reaction, Disp-2 each, R-0Normal  4. Gastroparesis     FMLA - patient may need up to 4 episodes per month up to 5 days per episode.     HgA1C = 7.7 today  No changes in diabetes medication.   Headed in right direction and we do not know what treatment is going to do to blood sugars and appetite.     Return in about 6 weeks (around 4/8/2024) for update on cancer treatment.  Data Unavailable     Orders Placed This Encounter   Procedures    POCT glycosylated hemoglobin (Hb A1C)     Orders Placed This Encounter   Medications    insulin glargine (BASAGLAR KWIKPEN) 100 UNIT/ML injection pen     Sig: Inject 30 Units into

## 2024-02-26 NOTE — PATIENT INSTRUCTIONS
FMLA - patient may need up to 4 episodes per month up to 5 days per episode.     HgA1C = 7.7 today  No changes in diabetes medication.   Headed in right direction and we do not know what treatment is going to do to blood sugars and appetite.

## 2024-06-24 DIAGNOSIS — E11.9 TYPE 2 DIABETES MELLITUS WITHOUT COMPLICATION, WITHOUT LONG-TERM CURRENT USE OF INSULIN (HCC): ICD-10-CM

## 2024-06-24 RX ORDER — ATORVASTATIN CALCIUM 20 MG/1
20 TABLET, FILM COATED ORAL
Qty: 90 TABLET | Refills: 0 | Status: SHIPPED | OUTPATIENT
Start: 2024-06-24

## 2024-08-23 DIAGNOSIS — E11.9 TYPE 2 DIABETES MELLITUS WITHOUT COMPLICATION, WITHOUT LONG-TERM CURRENT USE OF INSULIN (HCC): ICD-10-CM

## 2024-08-23 RX ORDER — DOXYCYCLINE HYCLATE 100 MG
100 TABLET ORAL DAILY
Qty: 90 TABLET | Refills: 1 | Status: SHIPPED | OUTPATIENT
Start: 2024-08-23

## 2024-08-23 RX ORDER — GLIPIZIDE 10 MG/1
20 TABLET ORAL
Qty: 180 TABLET | Refills: 1 | Status: SHIPPED | OUTPATIENT
Start: 2024-08-23

## 2024-08-23 NOTE — TELEPHONE ENCOUNTER
LAST VISIT:   2/26/2024     No future appointments.    Pharmacy verified? Yes    [id number] [place], [place] - [address] -  [phone] - F [phone] [address] [facility] [address] [facility] [address] [facility] [address]  Phone: [phone] Fax: [phone]

## 2025-02-15 DIAGNOSIS — E11.9 TYPE 2 DIABETES MELLITUS WITHOUT COMPLICATION, WITHOUT LONG-TERM CURRENT USE OF INSULIN (HCC): ICD-10-CM

## 2025-02-19 RX ORDER — DOXYCYCLINE HYCLATE 100 MG
100 TABLET ORAL DAILY
Qty: 30 TABLET | Refills: 0 | Status: SHIPPED | OUTPATIENT
Start: 2025-02-19

## 2025-02-19 RX ORDER — GLIPIZIDE 10 MG/1
20 TABLET ORAL
Qty: 60 TABLET | Refills: 0 | Status: SHIPPED | OUTPATIENT
Start: 2025-02-19

## 2025-02-20 NOTE — TELEPHONE ENCOUNTER
Medication refilled for 30 days. Patient needs appointment before next refill.  Please schedule patient.

## 2025-02-24 NOTE — TELEPHONE ENCOUNTER
Patient will call back to schedule within 30 days.  Understands he needs an appointment before next refill is provided.

## 2025-03-17 DIAGNOSIS — E11.9 TYPE 2 DIABETES MELLITUS WITHOUT COMPLICATION, WITHOUT LONG-TERM CURRENT USE OF INSULIN (HCC): ICD-10-CM

## 2025-03-18 RX ORDER — GLIPIZIDE 10 MG/1
20 TABLET ORAL
Qty: 180 TABLET | Refills: 1 | OUTPATIENT
Start: 2025-03-18

## 2025-03-21 DIAGNOSIS — E11.9 TYPE 2 DIABETES MELLITUS WITHOUT COMPLICATION, WITHOUT LONG-TERM CURRENT USE OF INSULIN (HCC): ICD-10-CM

## 2025-03-21 RX ORDER — GLIPIZIDE 10 MG/1
20 TABLET ORAL
Qty: 60 TABLET | Refills: 0 | OUTPATIENT
Start: 2025-03-21

## 2025-03-21 RX ORDER — DOXYCYCLINE HYCLATE 100 MG
100 TABLET ORAL DAILY
Qty: 30 TABLET | Refills: 0 | OUTPATIENT
Start: 2025-03-21